# Patient Record
Sex: FEMALE | URBAN - METROPOLITAN AREA
[De-identification: names, ages, dates, MRNs, and addresses within clinical notes are randomized per-mention and may not be internally consistent; named-entity substitution may affect disease eponyms.]

---

## 2020-08-13 ENCOUNTER — OFFICE VISIT (OUTPATIENT)
Dept: URBAN - METROPOLITAN AREA CLINIC 23 | Facility: CLINIC | Age: 40
End: 2020-08-13
Payer: COMMERCIAL

## 2020-08-13 ENCOUNTER — DASHBOARD ENCOUNTERS (OUTPATIENT)
Age: 40
End: 2020-08-13

## 2020-08-13 DIAGNOSIS — K62.9 RECTAL LESION: ICD-10-CM

## 2020-08-13 PROCEDURE — 99203 OFFICE O/P NEW LOW 30 MIN: CPT | Performed by: INTERNAL MEDICINE

## 2020-08-13 PROCEDURE — 46615 ANOSCOPY: CPT | Performed by: INTERNAL MEDICINE

## 2020-08-13 PROCEDURE — 46611 ANOSCOPY: CPT | Performed by: INTERNAL MEDICINE

## 2020-08-13 NOTE — HPI-TODAY'S VISIT:
She presents for evaluation of a bump in her anal canal,  feels that it is the size of a grain of rice, has been there for 2 months she feels that it  has gotten a little bigger in that time. she feels that it is a little sore. she feels there is no pain with bowel movements no bleeding at all no new abdominal pain- she has a long history of IBS and states that those symptoms are the baseline and not different   she denies any hard bm or straining for bowel movements  she has been working for [employer] at [facility]

## 2020-09-22 ENCOUNTER — OFFICE VISIT (OUTPATIENT)
Dept: URBAN - METROPOLITAN AREA SURGERY CENTER 15 | Facility: SURGERY CENTER | Age: 40
End: 2020-09-22